# Patient Record
(demographics unavailable — no encounter records)

---

## 2024-11-20 NOTE — PHYSICAL EXAM
[Alert] : alert [Normal Voice/Communication] : normal voice/communication [Healthy Appearing] : healthy appearing [No Acute Distress] : no acute distress [Sclera] : the sclera and conjunctiva were normal [Hearing Threshold Finger Rub Not [place]] : hearing was normal [Normal Lips/Gums] : the lips and gums were normal [Oropharynx] : the oropharynx was normal [Normal Appearance] : the appearance of the neck was normal [No Respiratory Distress] : no respiratory distress [No Acc Muscle Use] : no accessory muscle use [Respiration, Rhythm And Depth] : normal respiratory rhythm and effort [Auscultation Breath Sounds / Voice Sounds] : lungs were clear to auscultation bilaterally [Heart Rate And Rhythm] : heart rate was normal and rhythm regular [Normal S1, S2] : normal S1 and S2 [Murmurs] : no murmurs [Bowel Sounds] : normal bowel sounds [Abdomen Tenderness] : non-tender [No Masses] : no abdominal mass palpated [Abdomen Soft] : soft [] : no hepatosplenomegaly [Oriented To Time, Place, And Person] : oriented to person, place, and time

## 2024-11-20 NOTE — HISTORY OF PRESENT ILLNESS
[FreeTextEntry1] : The patient presents for evaluation of chronic intermittent left upper quadrant and left mid abdominal pain.  Starting about 1 year ago he has experienced occasional episodes of pain in the left upper quadrant and left mid abdomen which is crampy in nature and sometimes results in him eventually having a normal bowel movement.  The pain never last more than several hours with no other particular inciting or alleviating factors.  The pain occurs quite infrequently averaging once monthly.  He apparently underwent an abdominal sonogram in his primary care physician's office which was unremarkable.  He denies any heartburn.  He has been treated with omeprazole without any change in his symptoms.  He averages 2 normal bowel movements daily without any rectal bleeding.  His appetite and weight are stable.  There is no family history of colon cancer.

## 2024-11-20 NOTE — ASSESSMENT
[FreeTextEntry1] : The etiology of the patient's recurrent but occasional at most left upper quadrant left mid abdominal pain is of unknown etiology.  I have recommended that he obtain a CBC, comprehensive metabolic profile as well as celiac antibodies.  He will obtain a CAT scan of the abdomen and pelvis with IV contrast and will be scheduled for an upper endoscopy as well.  Further recommendations will depend upon the results. The risks, benefits, complications and possible adverse consequences associated with upper endoscopy were discussed with the patient.

## 2025-05-16 NOTE — PHYSICAL EXAM
[General Appearance - Alert] : alert [General Appearance - In No Acute Distress] : in no acute distress [General Appearance - Well Nourished] : well nourished [General Appearance - Well-Appearing] : healthy appearing [Sclera] : the sclera and conjunctiva were normal [PERRL With Normal Accommodation] : pupils were equal in size, round, reactive to light [Extraocular Movements] : extraocular movements were intact [Outer Ear] : the ears and nose were normal in appearance [Hearing Threshold Finger Rub Not [place]] : hearing was normal [Examination Of The Oral Cavity] : the lips and gums were normal [Both Tympanic Membranes Were Examined] : both tympanic membranes were normal [Oropharynx] : the oropharynx was normal with no thrush [Neck Appearance] : the appearance of the neck was normal [Neck Cervical Mass (___cm)] : no neck mass was observed [Jugular Venous Distention Increased] : there was no jugular-venous distention [Thyroid Diffuse Enlargement] : the thyroid was not enlarged [Respiration, Rhythm And Depth] : normal respiratory rhythm and effort [Exaggerated Use Of Accessory Muscles For Inspiration] : no accessory muscle use [Auscultation Breath Sounds / Voice Sounds] : lungs were clear to auscultation bilaterally [Heart Rate And Rhythm] : heart rate was normal and rhythm regular [Heart Sounds] : normal S1 and S2 [Heart Sounds Gallop] : no gallops [Murmurs] : no murmurs [Heart Sounds Pericardial Friction Rub] : no pericardial rub [Edema] : there was no peripheral edema [Bowel Sounds] : normal bowel sounds [Abdomen Soft] : soft [Abdomen Tenderness] : non-tender [Costovertebral Angle Tenderness] : no CVA tenderness [No Palpable Adenopathy] : no palpable adenopathy [Musculoskeletal - Swelling] : no joint swelling [Range of Motion to Joints] : range of motion to joints [Nail Clubbing] : no clubbing  or cyanosis of the fingernails [Motor Tone] : muscle strength and tone were normal [Skin Color & Pigmentation] : normal skin color and pigmentation [] : no rash [Skin Lesions] : no skin lesions [Cranial Nerves] : cranial nerves 2-12 were intact [Sensation] : the sensory exam was normal to light touch and pinprick [Motor Exam] : the motor exam was normal [No Focal Deficits] : no focal deficits [Oriented To Time, Place, And Person] : oriented to person, place, and time [Affect] : the affect was normal

## 2025-05-16 NOTE — HISTORY OF PRESENT ILLNESS
[FreeTextEntry1] : 44 yo male here for ID initial consult  partner currently lives with HIV - [place] and pt would like STI testing  has not heard of PrEP  noted with generalized fatigue and neck pain  the generalized fatigue he feels comes from the neck tension. the pain causes intermittent numbness of the left hand the pain was intermittent but is now constant for the past 10 days.  7/10  tried Ibuprofen with minimal results  pt does self massages and reduces stress with some result   pt with hx of rectal bleeding.  pt f/u with GI 11/2024 CT abdomen was unremarkable  blood work was ordered but not processed  pt denies any recent rectal bleeding.    PMH : none.  recent travel to [place].  denies any recent hospitalizations or blood transfusions PCP : Dr. Yaritza Hall  Vaccines : COVID Pfizer x2.  denies Influenza vaccines Screenings : none  PSH : denies  PFH : Father- HTN, Mother - HTN Social hx : denies any IVDU, smoking,  occasional ETOH, confirms tattoos  Sexual hx : MSM.  Sexually active with usual partner only.  denies any condom use.  denies any hx of STI Partner : Male.  +HIV - [place].   Occupation : construction  Who lives with : alone   NKDA     5/16/2025 :  PrEP  Counselled on PrEP Descovy vs Apretude- dosing, risks, benefits, SE, long term effects, monitoring, scheduling  1. start Descovy one tab daily  2. do blood work and cultures  3. f/u one week for results discussion   Exposure to STI pt's partner is being treated today for syphilis and gonorrhea.  Pt would like to hold off on treatment until blood work and culture results known 1. pt to avoid all sexual activity for the week until test results known.   2. cultures and blood work ordered today   Cervicalgia  noted with mild tension and inflammation of cervical paraspinal muscles.   1. start Vitamin B complex one tab daily 2. start Cyclobenzaprine one tab qhs- if no fatigue noted can increase to BID  3. continue Ibuprofen OTC PRN  4. warm compresses several times per day  5. daily stretching recommended  6. f/u if symptoms worsen  Rectal bleeding  pt had hx of rectal bleeding.  consulted with GI and imaging was unremarkable.  Blood work ordered was not processed.  Currently asymptomatic.  1. f/u with GI for further management.

## 2025-05-16 NOTE — ASSESSMENT
[FreeTextEntry1] : PrEP initial consult   5/16/2025 :  PrEP  Counselled on PrEP Descovy vs Apretude- dosing, risks, benefits, SE, long term effects, monitoring, scheduling  1. start Descovy one tab daily  2. do blood work and cultures  3. f/u one week for results discussion   Exposure to STI pt's partner is being treated today for syphilis and gonorrhea.  Pt would like to hold off on treatment until blood work and culture results known 1. pt to avoid all sexual activity for the week until test results known.   2. cultures and blood work ordered today   Cervicalgia  noted with mild tension and inflammation of cervical paraspinal muscles.   1. start Vitamin B complex one tab daily 2. start Cyclobenzaprine one tab qhs- if no fatigue noted can increase to BID  3. continue Ibuprofen OTC PRN  4. warm compresses several times per day  5. daily stretching recommended  6. f/u if symptoms worsen  Rectal bleeding  pt had hx of rectal bleeding.  consulted with GI and imaging was unremarkable.  Blood work ordered was not processed.  Currently asymptomatic.  1. f/u with GI for further management.   [Treatment Education] : treatment education [Treatment Adherence] : treatment adherence [Rx Dose / Side Effects] : Rx dose/side effects [Medical Care Issues] : medical care issues [HIV Education] : HIV Education [Sexuality / Safer Sex] : sexuality/safer sex [Education Materials Provided] : Eduction materials were provided

## 2025-05-16 NOTE — REVIEW OF SYSTEMS
[Feeling Tired] : feeling tired [Joint Pain] : joint pain [Negative] : Heme/Lymph [As Noted in HPI] : as noted in HPI [FreeTextEntry8] : hx of rectal bleeding- currently asymptomatic [de-identified] : numbness of right hand

## 2025-05-23 NOTE — HISTORY OF PRESENT ILLNESS
[FreeTextEntry1] : 44 yo male here for ID f/u consult and results discussion in the company of partner has not started Descovy yet due to would like to know results of testing today and did not receive it from the pharmacy  pt improved with neck discomfort  taking Cyclobenzaprine qhs with good result.   Pt noted with gluteal vesicular lesion  started a few days ago and is scabbing  pt states he had this in the past and occurs with excessive stress or fatigue  pt states he "pops" the lesion and places alcohol with some result  states he was told in the past that this was herpes but was not given treatment.  occurs only occasionally   He also states he as an internal rectal sore that started this week  noted with discomfort with BM he went to local [facility] and bought an antibiotic cream he is placing that on the lesion with good result.  feels the lesion is healing.      From previous consult 5/16/2025 :  44 yo male here for ID initial consult  partner currently lives with HIV - [place] and pt would like STI testing  has not heard of PrEP  noted with generalized fatigue and neck pain  the generalized fatigue he feels comes from the neck tension. the pain causes intermittent numbness of the left hand the pain was intermittent but is now constant for the past 10 days.  7/10  tried Ibuprofen with minimal results  pt does self massages and reduces stress with some result   pt with hx of rectal bleeding.  pt f/u with GI 11/2024 CT abdomen was unremarkable  blood work was ordered but not processed  pt denies any recent rectal bleeding.    PMH : none.  recent travel to [place].  denies any recent hospitalizations or blood transfusions PCP : Dr. Yaritza Hall  Vaccines : COVID Pfizer x2.  denies Influenza vaccines Screenings : none  PSH : denies  PFH : Father- HTN, Mother - HTN Social hx : denies any IVDU, smoking,  occasional ETOH, confirms tattoos  Sexual hx : MSM.  Sexually active with usual partner only.  denies any condom use.  denies any hx of STI Partner : Male.  +HIV - [place].   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  All test results from previous consult reviewed with patient.   1. start Descovy one tab daily - PrEP Annie approved and ready for dispensing today at local pharmacy 2. f/u 3 months   Syphilis pt noted with +syphilis RPR 1:4.  Pt denies any prior tx for syphilis  1. pt given Bicillin IM today and to receive abx weekly for 3 weeks total   2. repeat blood work in 3 months  3. pt to avoid all sexual activity until tx complete  Cervicalgia  noted with improved mild tension and inflammation of cervical paraspinal muscles since starting Cyclobenzaprine and Vitamin B complex 1. continue current treatment  resolved  Herpes  pt noted with herpes outbreak on right gluteal- currently scabbed over.  Counselled on what herpes is, risk factors for outbreak, treatment options, and modes of transmission.   1. start Valtrex one tab BIID x3 days 2. pt to avoid sexual activity during outbreak.    Rectal lesion  pt has internal rectal lesion.  Using abx cream obtained from local [facility] with good result  1. continue current treatment until resolved  2. f/u if symptoms persist or worsen.

## 2025-05-23 NOTE — ASSESSMENT
[FreeTextEntry1] : PrEP f/u consult.   5/23/2025 :  PrEP  pt has not started Descovy due to wanted to know test results and did not receive it from the pharmacy yet.  All test results from previous consult reviewed with patient.   1. start Descovy one tab daily - PrEP Annie approved and ready for dispensing today at local pharmacy 2. f/u 3 months   Syphilis pt noted with +syphilis RPR 1:4.  Pt denies any prior tx for syphilis  1. pt given Bicillin IM today and to receive abx weekly for 3 weeks total   2. repeat blood work in 3 months  3. pt to avoid all sexual activity until tx complete  Cervicalgia  noted with improved mild tension and inflammation of cervical paraspinal muscles since starting Cyclobenzaprine and Vitamin B complex 1. continue current treatment  resolved  Herpes  pt noted with herpes outbreak on right gluteal- currently scabbed over.  Counselled on what herpes is, risk factors for outbreak, treatment options, and modes of transmission.   1. start Valtrex one tab BIID x3 days 2. pt to avoid sexual activity during outbreak.    Rectal lesion  pt has internal rectal lesion.  [facility]  1. continue current treatment until resolved  2. f/u if symptoms persist or worsen.      [Treatment Education] : treatment education [Treatment Adherence] : treatment adherence [Rx Dose / Side Effects] : Rx dose/side effects [Medical Care Issues] : medical care issues [Sexuality / Safer Sex] : sexuality/safer sex

## 2025-05-23 NOTE — PHYSICAL EXAM
[General Appearance - Alert] : alert [General Appearance - In No Acute Distress] : in no acute distress [General Appearance - Well Nourished] : well nourished [General Appearance - Well-Appearing] : healthy appearing [Sclera] : the sclera and conjunctiva were normal [PERRL With Normal Accommodation] : pupils were equal in size, round, reactive to light [Extraocular Movements] : extraocular movements were intact [Outer Ear] : the ears and nose were normal in appearance [Hearing Threshold Finger Rub Not [place]] : hearing was normal [Examination Of The Oral Cavity] : the lips and gums were normal [Both Tympanic Membranes Were Examined] : both tympanic membranes were normal [Oropharynx] : the oropharynx was normal with no thrush [Neck Appearance] : the appearance of the neck was normal [Neck Cervical Mass (___cm)] : no neck mass was observed [Jugular Venous Distention Increased] : there was no jugular-venous distention [Thyroid Diffuse Enlargement] : the thyroid was not enlarged [Respiration, Rhythm And Depth] : normal respiratory rhythm and effort [Exaggerated Use Of Accessory Muscles For Inspiration] : no accessory muscle use [Auscultation Breath Sounds / Voice Sounds] : lungs were clear to auscultation bilaterally [Heart Rate And Rhythm] : heart rate was normal and rhythm regular [Heart Sounds] : normal S1 and S2 [Heart Sounds Gallop] : no gallops [Murmurs] : no murmurs [Heart Sounds Pericardial Friction Rub] : no pericardial rub [Edema] : there was no peripheral edema [Bowel Sounds] : normal bowel sounds [Abdomen Soft] : soft [Abdomen Tenderness] : non-tender [Costovertebral Angle Tenderness] : no CVA tenderness [No Palpable Adenopathy] : no palpable adenopathy [Musculoskeletal - Swelling] : no joint swelling [Range of Motion to Joints] : range of motion to joints [Nail Clubbing] : no clubbing  or cyanosis of the fingernails [Motor Tone] : muscle strength and tone were normal [Skin Color & Pigmentation] : normal skin color and pigmentation [] : no rash [Skin Lesions] : no skin lesions [FreeTextEntry1] : noted with scabbed quarter sized lesion on right gluteal area.   [Cranial Nerves] : cranial nerves 2-12 were intact [Sensation] : the sensory exam was normal to light touch and pinprick [Motor Exam] : the motor exam was normal [No Focal Deficits] : no focal deficits [Oriented To Time, Place, And Person] : oriented to person, place, and time [Affect] : the affect was normal

## 2025-06-06 NOTE — HISTORY OF PRESENT ILLNESS
[FreeTextEntry1] : 44 yo male here for ID f/u consult and results discussion  taking Descovy daily with no missed doses or SE  taking Doxycycline for syphilis infections with no SE noted - commenced 4 days ago rash from PCN allergy resolved pt unsure how much sun exposure to avoid  abstaining from sexual relations for now during treatment but questions if can have oral sex or sex with condom during treatment    From previous consult 5/23/2025 :  44 yo male here for ID f/u consult and results discussion in the company of partner has not started Descovy yet due to would like to know results of testing today and did not receive it from the pharmacy  pt improved with neck discomfort  taking Cyclobenzaprine qhs with good result.   Pt noted with gluteal vesicular lesion  started a few days ago and is scabbing  pt states he had this in the past and occurs with excessive stress or fatigue  pt states he "pops" the lesion and places alcohol with some result  states he was told in the past that this was herpes but was not given treatment.  occurs only occasionally   He also states he as an internal rectal sore that started this week  noted with discomfort with BM he went to local [place] and bought an antibiotic cream he is placing that on the lesion with good result.  feels the lesion is healing.      From previous consult 5/16/2025 :  44 yo male here for ID initial consult  partner currently lives with HIV - [place] and pt would like STI testing  has not heard of PrEP  noted with generalized fatigue and neck pain  the generalized fatigue he feels comes from the neck tension. the pain causes intermittent numbness of the left hand the pain was intermittent but is now constant for the past 10 days.  7/10  tried Ibuprofen with minimal results  pt does self massages and reduces stress with some result   pt with hx of rectal bleeding.  pt f/u with GI 11/2024 CT abdomen was unremarkable  blood work was ordered but not processed  pt denies any recent rectal bleeding.    PMH : none.  recent travel to [place].  denies any recent hospitalizations or blood transfusions PCP : Dr. Yaritza Hall  Vaccines : COVID Pfizer x2.  denies Influenza vaccines Screenings : none  PSH : denies  PFH : Father- HTN, Mother - HTN Social hx : denies any IVDU, smoking,  occasional ETOH, confirms tattoos  Sexual hx : MSM.  Sexually active with usual partner only.  denies any condom use.  denies any hx of STI Partner : Male.  +HIV - [place].   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 continue current treatment 2. f/u 3 months   Syphilis reviewed test results again with patient.  Counselled unclear when contracted syphilis infection since not previously tested.  Counselled to avoid all sexual relations during treatment except mutual masturbation.  PT to avoid the beach or sunbathing during treatment.   1. complete Doxycycline treatment   Allergic reaction  pt's rash resolved since last consult  resolved

## 2025-06-06 NOTE — ASSESSMENT
[FreeTextEntry1] : PrEP f/u consult   6/6/2025 :  PrEP  1. continue current treatment 2. f/u 3 months   Syphilis reviewed test results again with patient.  Counselled unclear when contracted syphilis infection since not previously tested.  Counselled to avoid all sexual relations during treatment except mutual masturbation.  PT to avoid the beach or sunbathing during treatment.   1. complete Doxycycline treatment   Allergic reaction  pt's rash resolved since last consult  resolved     [Treatment Education] : treatment education [Treatment Adherence] : treatment adherence [Medical Care Issues] : medical care issues

## 2025-06-06 NOTE — PHYSICAL EXAM
[General Appearance - Alert] : alert [General Appearance - In No Acute Distress] : in no acute distress [General Appearance - Well Nourished] : well nourished [General Appearance - Well-Appearing] : healthy appearing [Sclera] : the sclera and conjunctiva were normal [PERRL With Normal Accommodation] : pupils were equal in size, round, reactive to light [Extraocular Movements] : extraocular movements were intact [Outer Ear] : the ears and nose were normal in appearance [Hearing Threshold Finger Rub Not [place]] : hearing was normal [Examination Of The Oral Cavity] : the lips and gums were normal [Both Tympanic Membranes Were Examined] : both tympanic membranes were normal [Oropharynx] : the oropharynx was normal with no thrush [Neck Appearance] : the appearance of the neck was normal [Neck Cervical Mass (___cm)] : no neck mass was observed [Jugular Venous Distention Increased] : there was no jugular-venous distention [Thyroid Diffuse Enlargement] : the thyroid was not enlarged [Respiration, Rhythm And Depth] : normal respiratory rhythm and effort [Exaggerated Use Of Accessory Muscles For Inspiration] : no accessory muscle use [Auscultation Breath Sounds / Voice Sounds] : lungs were clear to auscultation bilaterally [Heart Rate And Rhythm] : heart rate was normal and rhythm regular [Heart Sounds] : normal S1 and S2 [Heart Sounds Gallop] : no gallops [Murmurs] : no murmurs [Heart Sounds Pericardial Friction Rub] : no pericardial rub [Edema] : there was no peripheral edema [Bowel Sounds] : normal bowel sounds [Abdomen Soft] : soft [Abdomen Tenderness] : non-tender [Costovertebral Angle Tenderness] : no CVA tenderness [No Palpable Adenopathy] : no palpable adenopathy [Musculoskeletal - Swelling] : no joint swelling [Range of Motion to Joints] : range of motion to joints [Nail Clubbing] : no clubbing  or cyanosis of the fingernails [Motor Tone] : muscle strength and tone were normal [Skin Color & Pigmentation] : normal skin color and pigmentation [] : no rash [Skin Lesions] : no skin lesions [Cranial Nerves] : cranial nerves 2-12 were intact [Sensation] : the sensory exam was normal to light touch and pinprick [Motor Exam] : the motor exam was normal [No Focal Deficits] : no focal deficits [Oriented To Time, Place, And Person] : oriented to person, place, and time [Affect] : the affect was normal